# Patient Record
(demographics unavailable — no encounter records)

---

## 2024-12-27 NOTE — HISTORY OF PRESENT ILLNESS
[de-identified] : Patient is a 34-year-old male here for evaluation of right ring finger.  Patient states on 12/26/2024 he was holding a toaster.  Patient was walking on the stairs, slipped and fell on his right side.  Patient states that the toaster bent his finger.  Patient was immediate pain.  Patient went to [facility], x-rays were taken and patient was told that he had a possible ring finger fracture.  Patient was placed in splint and told to follow-up orthopedics.  Denies numbness or tingling, denies instability  Right ring finger exam: Noted small abrasion to the radial aspect of the distal phalanx, no discharge, no sign of infection.  Tenderness palpation to radial aspect distal phalanx, DIPJ, good range of motion of digit, good strength, no gross instability neurovascular intact  X-ray right ring finger downloaded to PACS: Oblique lucency involving the fourth distal phalanx along the proximal lateral aspect with adjacent cortical fragments questionable of acute fracture  Discussed with patient in detail based off of point tenderness and x-rays symptoms are currently consistent with fracture.  Discussed fractures can generally take 6 to 8 weeks to fully heal, placed patient AlumaFoam splint to wear at all times.  Motrin/Tylenol as needed for pain.  Do not get splint wet.  Patient will follow-up in 2 weeks for repeat x-ray of right ring finger.  Cleaned abrasion, dressed with bacitracin and bandage, reviewed with patient how to change dressing.  Patient will call with any questions or concerns.  Patient understands agrees with plan